# Patient Record
Sex: FEMALE | Race: WHITE | HISPANIC OR LATINO
[De-identification: names, ages, dates, MRNs, and addresses within clinical notes are randomized per-mention and may not be internally consistent; named-entity substitution may affect disease eponyms.]

---

## 2018-12-13 PROBLEM — Z00.00 ENCOUNTER FOR PREVENTIVE HEALTH EXAMINATION: Status: ACTIVE | Noted: 2018-12-13

## 2019-10-10 ENCOUNTER — RECORD ABSTRACTING (OUTPATIENT)
Age: 54
End: 2019-10-10

## 2019-10-10 DIAGNOSIS — N92.0 EXCESSIVE AND FREQUENT MENSTRUATION WITH REGULAR CYCLE: ICD-10-CM

## 2019-10-10 DIAGNOSIS — Z78.9 OTHER SPECIFIED HEALTH STATUS: ICD-10-CM

## 2019-10-10 DIAGNOSIS — Z87.898 PERSONAL HISTORY OF OTHER SPECIFIED CONDITIONS: ICD-10-CM

## 2019-10-10 DIAGNOSIS — Z78.0 ASYMPTOMATIC MENOPAUSAL STATE: ICD-10-CM

## 2019-10-10 DIAGNOSIS — Z92.89 PERSONAL HISTORY OF OTHER MEDICAL TREATMENT: ICD-10-CM

## 2019-10-10 DIAGNOSIS — N39.3 STRESS INCONTINENCE (FEMALE) (MALE): ICD-10-CM

## 2019-10-10 DIAGNOSIS — Z82.49 FAMILY HISTORY OF ISCHEMIC HEART DISEASE AND OTHER DISEASES OF THE CIRCULATORY SYSTEM: ICD-10-CM

## 2019-10-10 DIAGNOSIS — R23.2 FLUSHING: ICD-10-CM

## 2019-10-10 LAB — CYTOLOGY CVX/VAG DOC THIN PREP: NORMAL

## 2019-12-12 ENCOUNTER — APPOINTMENT (OUTPATIENT)
Dept: OBGYN | Facility: CLINIC | Age: 54
End: 2019-12-12
Payer: MEDICAID

## 2019-12-12 VITALS
BODY MASS INDEX: 26.81 KG/M2 | WEIGHT: 142 LBS | SYSTOLIC BLOOD PRESSURE: 112 MMHG | HEIGHT: 61 IN | DIASTOLIC BLOOD PRESSURE: 84 MMHG

## 2019-12-12 DIAGNOSIS — R31.29 OTHER MICROSCOPIC HEMATURIA: ICD-10-CM

## 2019-12-12 DIAGNOSIS — Z12.11 ENCOUNTER FOR SCREENING FOR MALIGNANT NEOPLASM OF COLON: ICD-10-CM

## 2019-12-12 DIAGNOSIS — Z01.419 ENCOUNTER FOR GYNECOLOGICAL EXAMINATION (GENERAL) (ROUTINE) W/OUT ABNORMAL FINDINGS: ICD-10-CM

## 2019-12-12 DIAGNOSIS — Z78.9 OTHER SPECIFIED HEALTH STATUS: ICD-10-CM

## 2019-12-12 LAB
BILIRUB UR QL STRIP: NORMAL
DATE COLLECTED: NORMAL
GLUCOSE UR-MCNC: NORMAL
HCG UR QL: 0.2 EU/DL
HEMOCCULT SP1 STL QL: NEGATIVE
HGB UR QL STRIP.AUTO: ABNORMAL
KETONES UR-MCNC: NORMAL
LEUKOCYTE ESTERASE UR QL STRIP: NORMAL
NITRITE UR QL STRIP: NORMAL
PH UR STRIP: 7
PROT UR STRIP-MCNC: NORMAL
QUALITY CONTROL: YES
SP GR UR STRIP: 1

## 2019-12-12 PROCEDURE — 82270 OCCULT BLOOD FECES: CPT

## 2019-12-12 PROCEDURE — 99396 PREV VISIT EST AGE 40-64: CPT

## 2019-12-12 PROCEDURE — 81003 URINALYSIS AUTO W/O SCOPE: CPT | Mod: QW

## 2019-12-12 RX ORDER — UBIDECARENONE 30 MG
CAPSULE ORAL
Refills: 0 | Status: ACTIVE | COMMUNITY

## 2019-12-12 NOTE — DISCUSSION/SUMMARY
[FreeTextEntry1] : 54 year old postmenopausal  1 para 1002 is here for an annual exam. \par  \par Ms. BLAKE has dense breasts with no masses or axillary lymphadenopathy appreciated.  There was no expressible nipple discharge or any skin color changes, dimpling or retraction seen.  Her breast imaging was performed on 2018.  Continued monthly self breast examination was advised. \par  \par The importance of weight bearing exercise and adequate daily calcium with vitamin D3 to slow the progression of bone loss was underscored. \par \par For microscopic hematuria, we will check a formal analysis and culture.  She is asymptomatic and understands the differential diagnosis with the possibility of a urology referral pending the results.\par \par She has some whitening of the area above the clitoris consistent with lichen sclerosis but she denies any pruritus.  Call parameters were outlined.\par \par  All of her concerns were addressed, questions answered and reassurance was given

## 2019-12-12 NOTE — HISTORY OF PRESENT ILLNESS
[Good] : being in good health [1 Year Ago] : 1 year ago [Healthy Diet] : a healthy diet [Annual Vaginal Sonography ___] : annual vaginal sonograph [unfilled] [Last Pap Smear ___] : last Papanicolaou cytology done [unfilled] [Postmenopausal] : is postmenopausal [Last Mammogram ___] : last mammogram done [unfilled] [Pregnancy History] : pregnancy history: [Menarche Age: ____] : age at menarche was [unfilled] [Sexually Active] : is sexually active [Monogamous] : is monogamous [Male ___] : [unfilled] male [Last Colonoscopy ___] : last colonoscopy done [unfilled] [Performed: ___] : DEXA performed [unfilled] [Hot Flashes] : hot flashes [unknown] : the patient is unsure of the date of her LMP [Regular Exercise] : not exercising regularly [Weight Concerns] : no concerns with her weight [Menstrual Problems] : reports normal menses [de-identified] : Breast ultrasound 9/01/2018 [Burning] : no burning [Itching] : no itching [Mass] : no mass [Stinging] : no stinging [Lesion] : no lesion [Soreness] : no soreness [Discharge] : no discharge [Localized Pain] : no localized pain [Mass (___cm)] : no palpable mass [Diffused Pain] : no diffused pain [Nipple Discharge] : no nipple discharge [Skin Color Change] : no skin color change [Night Sweats] : no night sweats [Vaginal Itching] : no vaginal itching [Dyspareunia] : no dyspareunia [Mood Changes] : no mood changes [Contraception] : does not use contraception

## 2019-12-12 NOTE — PHYSICAL EXAM
[Awake] : awake [Alert] : alert [Soft] : soft [Oriented x3] : oriented to person, place, and time [Labia Majora] : labia major [Normal] : clitoris [Labia Minora] : labia minora [No Bleeding] : there was no active vaginal bleeding [No Tenderness] : no rectal tenderness [Uterine Adnexae] : were not tender and not enlarged [Acute Distress] : no acute distress [Mass] : no breast mass [Nipple Discharge] : no nipple discharge [Axillary LAD] : no axillary lymphadenopathy [Occult Blood] : occult blood test from digital rectal exam was negative [Tender] : non tender

## 2019-12-13 LAB
APPEARANCE: CLEAR
BACTERIA: NEGATIVE
BILIRUBIN URINE: NEGATIVE
BLOOD URINE: NEGATIVE
COLOR: COLORLESS
GLUCOSE QUALITATIVE U: NEGATIVE
HYALINE CASTS: 1 /LPF
KETONES URINE: NEGATIVE
LEUKOCYTE ESTERASE URINE: NEGATIVE
MICROSCOPIC-UA: NORMAL
NITRITE URINE: NEGATIVE
PH URINE: 6.5
PROTEIN URINE: NEGATIVE
RED BLOOD CELLS URINE: 0 /HPF
SPECIFIC GRAVITY URINE: 1
SQUAMOUS EPITHELIAL CELLS: 0 /HPF
UROBILINOGEN URINE: NORMAL
WHITE BLOOD CELLS URINE: 0 /HPF

## 2019-12-14 LAB
BACTERIA UR CULT: NORMAL
HPV HIGH+LOW RISK DNA PNL CVX: NOT DETECTED

## 2019-12-18 LAB — CYTOLOGY CVX/VAG DOC THIN PREP: NORMAL

## 2020-12-21 PROBLEM — Z01.419 ENCOUNTER FOR ANNUAL ROUTINE GYNECOLOGICAL EXAMINATION: Status: RESOLVED | Noted: 2019-12-12 | Resolved: 2020-12-21

## 2021-06-07 ENCOUNTER — APPOINTMENT (OUTPATIENT)
Dept: OBGYN | Facility: CLINIC | Age: 56
End: 2021-06-07
Payer: MEDICAID

## 2021-06-07 VITALS
HEIGHT: 63 IN | DIASTOLIC BLOOD PRESSURE: 70 MMHG | BODY MASS INDEX: 24.1 KG/M2 | SYSTOLIC BLOOD PRESSURE: 110 MMHG | WEIGHT: 136 LBS

## 2021-06-07 PROCEDURE — 99396 PREV VISIT EST AGE 40-64: CPT

## 2021-06-13 LAB — HPV HIGH+LOW RISK DNA PNL CVX: NOT DETECTED

## 2021-06-26 NOTE — PHYSICAL EXAM
[Appropriately responsive] : appropriately responsive [Alert] : alert [No Acute Distress] : no acute distress [No Lymphadenopathy] : no lymphadenopathy [Soft] : soft [Non-tender] : non-tender [Non-distended] : non-distended [No HSM] : No HSM [No Lesions] : no lesions [No Mass] : no mass [Oriented x3] : oriented x3 [Examination Of The Breasts] : a normal appearance [Normal] : normal [No Masses] : no breast masses were palpable [FreeTextEntry9] : Rey neg

## 2021-06-26 NOTE — HISTORY OF PRESENT ILLNESS
[FreeTextEntry1] : 55 y/o  with LMP:  presents for annual visit. Pt of Dr. Ramirez.\par \par Ob History: \par .  (twins) F/M vaginal \par \par Right breast biopsy needed.  going to [facility].\par \par \par \par  [Mammogramdate] : 02/20 [PapSmeardate] : 12/19

## 2022-06-13 ENCOUNTER — APPOINTMENT (OUTPATIENT)
Dept: OBGYN | Facility: CLINIC | Age: 57
End: 2022-06-13
Payer: MEDICAID

## 2022-06-13 VITALS
HEIGHT: 63 IN | WEIGHT: 136 LBS | BODY MASS INDEX: 24.1 KG/M2 | DIASTOLIC BLOOD PRESSURE: 60 MMHG | SYSTOLIC BLOOD PRESSURE: 110 MMHG

## 2022-06-13 DIAGNOSIS — R92.2 INCONCLUSIVE MAMMOGRAM: ICD-10-CM

## 2022-06-13 PROCEDURE — 99396 PREV VISIT EST AGE 40-64: CPT

## 2022-06-13 NOTE — HISTORY OF PRESENT ILLNESS
[FreeTextEntry1] : 56 yo  LMP 2014 for annual\par \par OB:\par   F/M twins (pt Radha Camarena)\par \par Right breast biopsy - [facility]\par \par No PMB, no changes to bowel nor bladder.\par \par  [Mammogramdate] : '21 [BreastSonogramDate] : '21 [PapSmeardate] : 5/21 [ColonoscopyDate] : yes [TextBox_31] : Pap and HPV negative [TextBox_43] : about 7 yrs ago

## 2022-06-13 NOTE — DISCUSSION/SUMMARY
[FreeTextEntry1] : Health Maintenance:\par -Pap with HPV neg in '21.  repeat next year\par -Mammo and US breast Rx\par -TBSE\par -colonoscopy guidelines reviewed with patient\par -Achieve Vit D levels of 30-40, intake of 1100 mg daily calcium mostly thru dark green\par leafy greens and milk products, exercise 30 minutes TIW\par \par Try to follow up on right breast biopsy

## 2023-08-14 ENCOUNTER — APPOINTMENT (OUTPATIENT)
Dept: OBGYN | Facility: CLINIC | Age: 58
End: 2023-08-14
Payer: MEDICAID

## 2023-08-14 VITALS
HEIGHT: 63 IN | DIASTOLIC BLOOD PRESSURE: 78 MMHG | WEIGHT: 139 LBS | SYSTOLIC BLOOD PRESSURE: 120 MMHG | BODY MASS INDEX: 24.63 KG/M2

## 2023-08-14 DIAGNOSIS — Z12.4 ENCOUNTER FOR SCREENING FOR MALIGNANT NEOPLASM OF CERVIX: ICD-10-CM

## 2023-08-14 DIAGNOSIS — Z12.39 ENCOUNTER FOR OTHER SCREENING FOR MALIGNANT NEOPLASM OF BREAST: ICD-10-CM

## 2023-08-14 PROCEDURE — 99396 PREV VISIT EST AGE 40-64: CPT

## 2023-08-14 RX ORDER — PANTOPRAZOLE SODIUM 100 %
POWDER (GRAM) MISCELLANEOUS
Refills: 0 | Status: DISCONTINUED | COMMUNITY
End: 2023-08-14

## 2023-08-14 NOTE — PHYSICAL EXAM
[Appropriately responsive] : appropriately responsive [Alert] : alert [No Acute Distress] : no acute distress [No Lymphadenopathy] : no lymphadenopathy [Soft] : soft [Non-tender] : non-tender [Non-distended] : non-distended [No HSM] : No HSM [No Lesions] : no lesions [No Mass] : no mass [Oriented x3] : oriented x3 [Examination Of The Breasts] : a normal appearance [No Masses] : no breast masses were palpable [Vulvar Atrophy] : vulvar atrophy [Labia Majora] : normal [Labia Minora] : normal [Normal] : normal [Uterine Adnexae] : normal

## 2023-08-14 NOTE — DISCUSSION/SUMMARY
[FreeTextEntry1] : Health Maintenance: -Pap with HPV -Mammo Rx -TBSE -colonoscopy guidelines reviewed with patient -Achieve Vit D levels of 30-40, intake of 1100 mg daily calcium mostly thru dark green leafy greens and milk products, exercise 30 minutes TIW

## 2023-08-14 NOTE — HISTORY OF PRESENT ILLNESS
[FreeTextEntry1] : 59 yo  LMP 2014 for annual  OB:   F/M twins (pt Radha Camarena)  Right breast biopsy - [facility]  No PMB, no changes to bowel nor bladder.

## 2023-08-23 LAB — HPV HIGH+LOW RISK DNA PNL CVX: NOT DETECTED

## 2023-08-24 LAB — CYTOLOGY CVX/VAG DOC THIN PREP: NORMAL

## 2023-08-29 ENCOUNTER — TRANSCRIPTION ENCOUNTER (OUTPATIENT)
Age: 58
End: 2023-08-29

## 2023-08-30 ENCOUNTER — OUTPATIENT (OUTPATIENT)
Dept: OUTPATIENT SERVICES | Facility: HOSPITAL | Age: 58
LOS: 1 days | End: 2023-08-30
Payer: MEDICAID

## 2023-08-30 ENCOUNTER — TRANSCRIPTION ENCOUNTER (OUTPATIENT)
Age: 58
End: 2023-08-30

## 2023-08-30 VITALS
TEMPERATURE: 98 F | HEIGHT: 62.5 IN | RESPIRATION RATE: 15 BRPM | SYSTOLIC BLOOD PRESSURE: 142 MMHG | OXYGEN SATURATION: 97 % | WEIGHT: 136.69 LBS | DIASTOLIC BLOOD PRESSURE: 87 MMHG | HEART RATE: 74 BPM

## 2023-08-30 VITALS
DIASTOLIC BLOOD PRESSURE: 92 MMHG | HEART RATE: 80 BPM | SYSTOLIC BLOOD PRESSURE: 135 MMHG | RESPIRATION RATE: 18 BRPM | OXYGEN SATURATION: 100 %

## 2023-08-30 DIAGNOSIS — H35.342 MACULAR CYST, HOLE, OR PSEUDOHOLE, LEFT EYE: ICD-10-CM

## 2023-08-30 PROCEDURE — 67042 VIT FOR MACULAR HOLE: CPT | Mod: AS

## 2023-08-30 PROCEDURE — 67042 VIT FOR MACULAR HOLE: CPT | Mod: LT

## 2023-08-30 PROCEDURE — C1889: CPT

## 2023-08-30 DEVICE — GS C3F8 PERFLUOROPROPANE IOL 2.5 L 20GM
Type: IMPLANTABLE DEVICE | Site: LEFT | Status: NON-FUNCTIONAL
Removed: 2023-08-30

## 2023-08-30 NOTE — ASU PATIENT PROFILE, ADULT - FALL HARM RISK - HARM RISK INTERVENTIONS

## 2023-08-30 NOTE — ASU DISCHARGE PLAN (ADULT/PEDIATRIC) - NS MD DC FALL RISK RISK
For information on Fall & Injury Prevention, visit: [email or web address] OR  [email or web address] OR  https://www.cdc.gov/steadi/patient.html

## 2023-08-30 NOTE — ASU DISCHARGE PLAN (ADULT/PEDIATRIC) - NURSING INSTRUCTIONS
Eye shield with instructions , sunglasses and eye kit given to patient.  Gas bubble instructions reviewed with good understanding ,gas bubble bracelet on pt.

## 2023-08-30 NOTE — ASU PREOP CHECKLIST - TEMPERATURE IN CELSIUS (DEGREES C)
She was reevaluated by Dr. Irving Florence 11/16/2021 for recurrent urinary tract infections and microscopic hematuria.  She will be scheduled for the CT scan and cystoscopy.  
She was reevaluated by Dr. Ivring Florence 11/16/2021 for recurrent urinary tract infections and microscopic hematuria.  She will be scheduled for the CT scan and cystoscopy.    
36.8

## 2023-08-30 NOTE — ASU PATIENT PROFILE, ADULT - NSICDXPASTMEDICALHX_GEN_ALL_CORE_FT
PAST MEDICAL HISTORY:  Diverticulosis     Hyperlipidemia     Macular hole, left     Osteopenia     Vitamin D deficiency

## 2023-08-30 NOTE — ASU DISCHARGE PLAN (ADULT/PEDIATRIC) - CARE PROVIDER_API CALL
Chris Whyte  Ophthalmology  [address] [place] [address], [address]  [place] [address]  Phone: [phone]  Fax: [phone]  Scheduled Appointment: 08/31/2023 10:15 AM

## 2023-08-30 NOTE — ASU PATIENT PROFILE, ADULT - VISION (WITH CORRECTIVE LENSES IF THE PATIENT USUALLY WEARS THEM):
left eye poor vision/Partially impaired: cannot see medication labels or newsprint, but can see obstacles in path, and the surrounding layout; can count fingers at arm's length

## 2023-12-18 ENCOUNTER — NON-APPOINTMENT (OUTPATIENT)
Age: 58
End: 2023-12-18

## 2024-05-02 ENCOUNTER — OFFICE (OUTPATIENT)
Dept: URBAN - METROPOLITAN AREA CLINIC 103 | Facility: CLINIC | Age: 59
Setting detail: OPHTHALMOLOGY
End: 2024-05-02
Payer: MEDICAID

## 2024-05-02 DIAGNOSIS — H04.123: ICD-10-CM

## 2024-05-02 DIAGNOSIS — H02.423: ICD-10-CM

## 2024-05-02 PROCEDURE — 92285 EXTERNAL OCULAR PHOTOGRAPHY: CPT | Performed by: OPHTHALMOLOGY

## 2024-05-02 PROCEDURE — 92004 COMPRE OPH EXAM NEW PT 1/>: CPT | Performed by: OPHTHALMOLOGY

## 2024-05-02 PROCEDURE — 83861 MICROFLUID ANALY TEARS: CPT | Performed by: OPHTHALMOLOGY

## 2024-05-02 PROCEDURE — 92082 INTERMEDIATE VISUAL FIELD XM: CPT | Performed by: OPHTHALMOLOGY

## 2024-05-02 ASSESSMENT — CONFRONTATIONAL VISUAL FIELD TEST (CVF)
OS_FINDINGS: FULL
OD_FINDINGS: FULL

## 2024-05-20 ENCOUNTER — ASC (OUTPATIENT)
Dept: URBAN - METROPOLITAN AREA SURGERY 8 | Facility: SURGERY | Age: 59
Setting detail: OPHTHALMOLOGY
End: 2024-05-20
Payer: MEDICAID

## 2024-05-20 DIAGNOSIS — H02.524: ICD-10-CM

## 2024-05-20 DIAGNOSIS — H02.422: ICD-10-CM

## 2024-05-20 DIAGNOSIS — H02.521: ICD-10-CM

## 2024-05-20 DIAGNOSIS — H02.421: ICD-10-CM

## 2024-05-20 PROCEDURE — 67900 REPAIR BROW DEFECT: CPT | Mod: 50 | Performed by: OPHTHALMOLOGY

## 2024-05-20 PROCEDURE — 67904 REPAIR EYELID DEFECT: CPT | Mod: 50 | Performed by: OPHTHALMOLOGY

## 2024-05-21 ENCOUNTER — RX ONLY (RX ONLY)
Age: 59
End: 2024-05-21

## 2024-05-21 ENCOUNTER — OFFICE (OUTPATIENT)
Dept: URBAN - METROPOLITAN AREA CLINIC 94 | Facility: CLINIC | Age: 59
Setting detail: OPHTHALMOLOGY
End: 2024-05-21
Payer: MEDICAID

## 2024-05-21 DIAGNOSIS — H02.834: ICD-10-CM

## 2024-05-21 DIAGNOSIS — H02.831: ICD-10-CM

## 2024-05-21 DIAGNOSIS — H02.521: ICD-10-CM

## 2024-05-21 DIAGNOSIS — H02.421: ICD-10-CM

## 2024-05-21 DIAGNOSIS — H02.423: ICD-10-CM

## 2024-05-21 DIAGNOSIS — H02.524: ICD-10-CM

## 2024-05-21 DIAGNOSIS — H02.422: ICD-10-CM

## 2024-05-21 PROBLEM — H04.123 DRY EYE SYNDROME LACRIMAL GLAND; BOTH EYES: Status: ACTIVE | Noted: 2024-05-02

## 2024-05-21 PROCEDURE — 99024 POSTOP FOLLOW-UP VISIT: CPT | Performed by: OPHTHALMOLOGY

## 2024-05-21 ASSESSMENT — LID POSITION - DERMATOCHALASIS
OD_DERMATOCHALASIS: ABSENT
OS_DERMATOCHALASIS: ABSENT

## 2024-05-21 ASSESSMENT — LID POSITION - PTOSIS
OD_PTOSIS: ABSENT
OS_PTOSIS: ABSENT

## 2024-05-21 ASSESSMENT — CONFRONTATIONAL VISUAL FIELD TEST (CVF)
OD_FINDINGS: FULL
OS_FINDINGS: FULL

## 2024-06-13 ENCOUNTER — OFFICE (OUTPATIENT)
Dept: URBAN - METROPOLITAN AREA CLINIC 105 | Facility: CLINIC | Age: 59
Setting detail: OPHTHALMOLOGY
End: 2024-06-13
Payer: MEDICAID

## 2024-06-13 DIAGNOSIS — H02.423: ICD-10-CM

## 2024-06-13 DIAGNOSIS — H02.524: ICD-10-CM

## 2024-06-13 DIAGNOSIS — H02.834: ICD-10-CM

## 2024-06-13 DIAGNOSIS — H02.521: ICD-10-CM

## 2024-06-13 DIAGNOSIS — H02.831: ICD-10-CM

## 2024-06-13 PROCEDURE — 99024 POSTOP FOLLOW-UP VISIT: CPT | Performed by: REGISTERED NURSE

## 2024-06-13 ASSESSMENT — CONFRONTATIONAL VISUAL FIELD TEST (CVF)
OS_FINDINGS: FULL
OD_FINDINGS: FULL

## 2024-06-13 ASSESSMENT — LID POSITION - PTOSIS
OS_PTOSIS: ABSENT
OD_PTOSIS: ABSENT

## 2024-06-13 ASSESSMENT — LID POSITION - DERMATOCHALASIS
OD_DERMATOCHALASIS: ABSENT
OS_DERMATOCHALASIS: ABSENT

## 2024-09-05 ENCOUNTER — OFFICE (OUTPATIENT)
Dept: URBAN - METROPOLITAN AREA CLINIC 103 | Facility: CLINIC | Age: 59
Setting detail: OPHTHALMOLOGY
End: 2024-09-05
Payer: MEDICAID

## 2024-09-05 DIAGNOSIS — H04.123: ICD-10-CM

## 2024-09-05 DIAGNOSIS — H02.423: ICD-10-CM

## 2024-09-05 DIAGNOSIS — H02.421: ICD-10-CM

## 2024-09-05 DIAGNOSIS — H02.524: ICD-10-CM

## 2024-09-05 DIAGNOSIS — H02.834: ICD-10-CM

## 2024-09-05 DIAGNOSIS — H02.831: ICD-10-CM

## 2024-09-05 DIAGNOSIS — H02.521: ICD-10-CM

## 2024-09-05 DIAGNOSIS — H02.422: ICD-10-CM

## 2024-09-05 PROCEDURE — 92012 INTRM OPH EXAM EST PATIENT: CPT | Performed by: OPHTHALMOLOGY

## 2024-09-05 ASSESSMENT — LID POSITION - PTOSIS
OD_PTOSIS: ABSENT
OS_PTOSIS: ABSENT

## 2024-09-05 ASSESSMENT — CONFRONTATIONAL VISUAL FIELD TEST (CVF)
OS_FINDINGS: FULL
OD_FINDINGS: FULL

## 2024-09-05 ASSESSMENT — LID POSITION - DERMATOCHALASIS
OS_DERMATOCHALASIS: ABSENT
OD_DERMATOCHALASIS: ABSENT

## 2024-09-16 ENCOUNTER — APPOINTMENT (OUTPATIENT)
Dept: OBGYN | Facility: CLINIC | Age: 59
End: 2024-09-16
Payer: COMMERCIAL

## 2024-09-16 VITALS
DIASTOLIC BLOOD PRESSURE: 67 MMHG | BODY MASS INDEX: 24.8 KG/M2 | WEIGHT: 140 LBS | SYSTOLIC BLOOD PRESSURE: 110 MMHG | HEIGHT: 63 IN

## 2024-09-16 DIAGNOSIS — Z01.419 ENCOUNTER FOR GYNECOLOGICAL EXAMINATION (GENERAL) (ROUTINE) W/OUT ABNORMAL FINDINGS: ICD-10-CM

## 2024-09-16 DIAGNOSIS — Z12.39 ENCOUNTER FOR OTHER SCREENING FOR MALIGNANT NEOPLASM OF BREAST: ICD-10-CM

## 2024-09-16 LAB
CARD LOT #: NORMAL
CARD LOT EXP DATE: NORMAL
DATE COLLECTED: NORMAL
DATE COLLECTED: NORMAL
DEVELOPER LOT #: NORMAL
DEVELOPER LOT EXP DATE: NORMAL
HEMOCCULT 2: NEGATIVE
HEMOCCULT SP1 STL QL: NEGATIVE
QUALITY CONTROL: YES
QUALITY CONTROL: YES

## 2024-09-16 PROCEDURE — 99396 PREV VISIT EST AGE 40-64: CPT

## 2024-09-16 PROCEDURE — 82270 OCCULT BLOOD FECES: CPT

## 2024-09-16 PROCEDURE — 99459 PELVIC EXAMINATION: CPT

## 2024-09-16 NOTE — HISTORY OF PRESENT ILLNESS
[FreeTextEntry1] : 58 yo  LMP 2014 for annual gyn visit   Gyn:  Right breast biopsy - [facility] No PMB, no changes to bowel nor bladder.  OB:   F/M twins (pt Radha Camarena)  PMHx:  Denies  Sx:   Right breast bx: fibroadenoma   FHx:  Mother: HTN   SH: ,  Non smoker.     [Mammogramdate] : 12/2023  [TextBox_19] : BIRADS 1 [BreastSonogramDate] : 12/2023  [TextBox_25] : BIRADS 1 [PapSmeardate] : 2023  [TextBox_31] : NL [TextBox_43] : unsure

## 2024-09-16 NOTE — PHYSICAL EXAM
[Chaperone Present] : A chaperone was present in the examining room during all aspects of the physical examination [[id number]] : A chaperone was present during the pelvic exam. [Appropriately responsive] : appropriately responsive [Alert] : alert [No Acute Distress] : no acute distress [No Lymphadenopathy] : no lymphadenopathy [Soft] : soft [Non-tender] : non-tender [Oriented x3] : oriented x3 [Examination Of The Breasts] : a normal appearance [No Masses] : no breast masses were palpable [Labia Majora] : normal [Labia Minora] : normal [Normal] : normal [Uterine Adnexae] : normal [No Tenderness] : no tenderness [Nl Sphincter Tone] : normal sphincter tone [FreeTextEntry2] : Deb Arnold PA-C [FreeTextEntry6] : axial uterus, 7cm  [FreeTextEntry9] : stool guiac neg

## 2024-09-16 NOTE — DISCUSSION/SUMMARY
[FreeTextEntry1] : Health Maintenance:  -Pap with HPV (2023) NL/HPV neg  -Mammo/sono Rx -TBSE -colonoscopy guidelines reviewed with patient- importance of screening discussed, contact information to schedule screening given -Stool guiac neg  -Achieve Vit D levels of 30-40, intake of 1100 mg daily calcium mostly thru dark green leafy greens and milk products, exercise 30 minutes TIW

## 2025-04-16 ENCOUNTER — OFFICE (OUTPATIENT)
Dept: URBAN - METROPOLITAN AREA CLINIC 103 | Facility: CLINIC | Age: 60
Setting detail: OPHTHALMOLOGY
End: 2025-04-16
Payer: COMMERCIAL

## 2025-04-16 DIAGNOSIS — H02.524: ICD-10-CM

## 2025-04-16 DIAGNOSIS — H04.123: ICD-10-CM

## 2025-04-16 DIAGNOSIS — H02.423: ICD-10-CM

## 2025-04-16 DIAGNOSIS — H02.521: ICD-10-CM

## 2025-04-16 DIAGNOSIS — H02.831: ICD-10-CM

## 2025-04-16 PROBLEM — H25.13 CATARACT SENILE NUCLEAR SCLEROSIS; BOTH EYES: Status: ACTIVE | Noted: 2025-04-16

## 2025-04-16 PROCEDURE — 83861 MICROFLUID ANALY TEARS: CPT | Performed by: OPHTHALMOLOGY

## 2025-04-16 PROCEDURE — 92012 INTRM OPH EXAM EST PATIENT: CPT | Performed by: OPHTHALMOLOGY

## 2025-04-16 ASSESSMENT — REFRACTION_AUTOREFRACTION
OD_SPHERE: 0.00
OD_CYLINDER: -0.25
OD_AXIS: 087
OS_AXIS: 145
OS_CYLINDER: -0.50
OS_SPHERE: -0.75

## 2025-04-16 ASSESSMENT — VISUAL ACUITY
OS_BCVA: 20/20
OD_BCVA: 20/100

## 2025-04-16 ASSESSMENT — SUPERFICIAL PUNCTATE KERATITIS (SPK)
OD_SPK: 2+ 3+
OS_SPK: 2+

## 2025-04-16 ASSESSMENT — LID POSITION - DERMATOCHALASIS
OD_DERMATOCHALASIS: ABSENT
OS_DERMATOCHALASIS: ABSENT

## 2025-04-16 ASSESSMENT — LID POSITION - PTOSIS
OS_PTOSIS: ABSENT
OD_PTOSIS: ABSENT

## 2025-04-16 ASSESSMENT — KERATOMETRY
OS_K1POWER_DIOPTERS: 46.00
OS_AXISANGLE_DEGREES: 090
OD_AXISANGLE_DEGREES: 83
OD_K1POWER_DIOPTERS: 46.00
OD_K2POWER_DIOPTERS: 47.00
OS_K2POWER_DIOPTERS: 47.25

## 2025-06-26 ENCOUNTER — OFFICE (OUTPATIENT)
Dept: URBAN - METROPOLITAN AREA CLINIC 94 | Facility: CLINIC | Age: 60
Setting detail: OPHTHALMOLOGY
End: 2025-06-26
Payer: COMMERCIAL

## 2025-06-26 DIAGNOSIS — H02.423: ICD-10-CM

## 2025-06-26 DIAGNOSIS — H02.831: ICD-10-CM

## 2025-06-26 DIAGNOSIS — H40.033: ICD-10-CM

## 2025-06-26 DIAGNOSIS — H02.524: ICD-10-CM

## 2025-06-26 DIAGNOSIS — H02.521: ICD-10-CM

## 2025-06-26 PROBLEM — H35.342 MACULAR HOLE OR PSEUDOHOLE; LEFT EYE: Status: ACTIVE | Noted: 2025-06-26

## 2025-06-26 PROBLEM — H43.393 VITREOUS FLOATERS; BOTH EYES: Status: ACTIVE | Noted: 2025-06-26

## 2025-06-26 PROCEDURE — 92250 FUNDUS PHOTOGRAPHY W/I&R: CPT | Performed by: OPHTHALMOLOGY

## 2025-06-26 PROCEDURE — 92014 COMPRE OPH EXAM EST PT 1/>: CPT | Performed by: OPHTHALMOLOGY

## 2025-06-26 ASSESSMENT — REFRACTION_AUTOREFRACTION
OS_AXIS: 153
OD_CYLINDER: -0.25
OS_CYLINDER: -0.50
OD_SPHERE: 0.00
OS_SPHERE: -2.25
OD_AXIS: 081

## 2025-06-26 ASSESSMENT — LID POSITION - DERMATOCHALASIS
OD_DERMATOCHALASIS: ABSENT
OS_DERMATOCHALASIS: ABSENT

## 2025-06-26 ASSESSMENT — SUPERFICIAL PUNCTATE KERATITIS (SPK)
OD_SPK: 2+ 3+
OS_SPK: 2+

## 2025-06-26 ASSESSMENT — KERATOMETRY
OS_AXISANGLE_DEGREES: 093
OS_K1POWER_DIOPTERS: 46.00
OD_K1POWER_DIOPTERS: 46.00
OD_K2POWER_DIOPTERS: 47.00
OS_K2POWER_DIOPTERS: 47.25
OD_AXISANGLE_DEGREES: 085

## 2025-06-26 ASSESSMENT — VISUAL ACUITY
OS_BCVA: 20/20
OD_BCVA: 20/250

## 2025-06-26 ASSESSMENT — LID POSITION - PTOSIS
OD_PTOSIS: ABSENT
OS_PTOSIS: ABSENT

## 2025-06-26 ASSESSMENT — TONOMETRY
OD_IOP_MMHG: 16
OS_IOP_MMHG: 16

## 2025-06-26 ASSESSMENT — CONFRONTATIONAL VISUAL FIELD TEST (CVF)
OS_FINDINGS: FULL
OD_FINDINGS: FULL

## 2025-07-12 ENCOUNTER — OFFICE (OUTPATIENT)
Dept: URBAN - METROPOLITAN AREA CLINIC 112 | Facility: CLINIC | Age: 60
Setting detail: OPHTHALMOLOGY
End: 2025-07-12
Payer: COMMERCIAL

## 2025-07-12 DIAGNOSIS — H02.423: ICD-10-CM

## 2025-07-12 DIAGNOSIS — H02.831: ICD-10-CM

## 2025-07-12 DIAGNOSIS — H02.521: ICD-10-CM

## 2025-07-12 DIAGNOSIS — H02.524: ICD-10-CM

## 2025-07-12 PROCEDURE — 99213 OFFICE O/P EST LOW 20 MIN: CPT | Performed by: OPHTHALMOLOGY

## 2025-07-12 ASSESSMENT — LID POSITION - DERMATOCHALASIS
OS_DERMATOCHALASIS: ABSENT
OD_DERMATOCHALASIS: ABSENT

## 2025-07-12 ASSESSMENT — REFRACTION_AUTOREFRACTION
OS_CYLINDER: -0.50
OD_CYLINDER: -0.25
OD_SPHERE: 0.00
OS_AXIS: 153
OS_SPHERE: -2.25
OD_AXIS: 081

## 2025-07-12 ASSESSMENT — VISUAL ACUITY
OD_BCVA: 20/150
OS_BCVA: 20/20

## 2025-07-12 ASSESSMENT — CONFRONTATIONAL VISUAL FIELD TEST (CVF)
OS_FINDINGS: FULL
OD_FINDINGS: FULL

## 2025-07-12 ASSESSMENT — SUPERFICIAL PUNCTATE KERATITIS (SPK)
OD_SPK: 2+ 3+
OS_SPK: 2+

## 2025-07-12 ASSESSMENT — LID POSITION - PTOSIS
OS_PTOSIS: ABSENT
OD_PTOSIS: ABSENT

## 2025-07-12 ASSESSMENT — TONOMETRY
OD_IOP_MMHG: 18
OS_IOP_MMHG: 17

## 2025-07-12 ASSESSMENT — KERATOMETRY
OD_AXISANGLE_DEGREES: 085
OS_K1POWER_DIOPTERS: 46.00
OS_AXISANGLE_DEGREES: 093
OD_K1POWER_DIOPTERS: 46.00
OD_K2POWER_DIOPTERS: 47.00
OS_K2POWER_DIOPTERS: 47.25

## (undated) DEVICE — PACK CONSTELLATION POST 23G 10K

## (undated) DEVICE — FLEXLOOP CURVED SCRAPER MEMBRANE 25G

## (undated) DEVICE — SOL IRR SALT BSS PLUS 500ML

## (undated) DEVICE — DRAPE 1/2 SHEET 40X57"

## (undated) DEVICE — PACK VITRECTOMY

## (undated) DEVICE — SUT VICRYL 8-0 12" TG140-8 DA

## (undated) DEVICE — WARMING BLANKET LOWER ADULT

## (undated) DEVICE — AUTO GAS FILL CONSTELLATION

## (undated) DEVICE — GLV 7.5 PROTEXIS (WHITE)

## (undated) DEVICE — PACK CONSTELLATION POST 25G 20K

## (undated) DEVICE — CANNULA ALCON SOFT TIP 23G

## (undated) DEVICE — DRAPE STERI-DRAPE INCISE 23X17"

## (undated) DEVICE — LENS VITRECTOMY FLAT

## (undated) DEVICE — ILM FORCEP 25G

## (undated) DEVICE — DRAPE MICROSCOPE RESIGHT

## (undated) DEVICE — VENODYNE/SCD SLEEVE CALF MEDIUM

## (undated) DEVICE — ILM FORCEP 23G

## (undated) DEVICE — CANNULA ALCON SOFT TIP 25G

## (undated) DEVICE — SOL BALANCE SALT 15ML